# Patient Record
Sex: MALE | Race: OTHER
[De-identification: names, ages, dates, MRNs, and addresses within clinical notes are randomized per-mention and may not be internally consistent; named-entity substitution may affect disease eponyms.]

---

## 2020-02-11 ENCOUNTER — HOSPITAL ENCOUNTER (EMERGENCY)
Dept: HOSPITAL 72 - EMR | Age: 25
Discharge: HOME | End: 2020-02-11
Payer: COMMERCIAL

## 2020-02-11 VITALS — HEIGHT: 70 IN | WEIGHT: 161 LBS | BODY MASS INDEX: 23.05 KG/M2

## 2020-02-11 VITALS — DIASTOLIC BLOOD PRESSURE: 78 MMHG | SYSTOLIC BLOOD PRESSURE: 144 MMHG

## 2020-02-11 VITALS — SYSTOLIC BLOOD PRESSURE: 129 MMHG | DIASTOLIC BLOOD PRESSURE: 74 MMHG

## 2020-02-11 DIAGNOSIS — Z91.013: ICD-10-CM

## 2020-02-11 DIAGNOSIS — J45.901: Primary | ICD-10-CM

## 2020-02-11 DIAGNOSIS — R00.0: ICD-10-CM

## 2020-02-11 LAB
ADD MANUAL DIFF: NO
ALBUMIN SERPL-MCNC: 4.6 G/DL (ref 3.4–5)
ALBUMIN/GLOB SERPL: 1 {RATIO} (ref 1–2.7)
ALP SERPL-CCNC: 125 U/L (ref 46–116)
ALT SERPL-CCNC: 26 U/L (ref 12–78)
ANION GAP SERPL CALC-SCNC: 15 MMOL/L (ref 5–15)
APPEARANCE UR: CLEAR
APTT PPP: YELLOW S
AST SERPL-CCNC: 10 U/L (ref 15–37)
BASOPHILS NFR BLD AUTO: 0.6 % (ref 0–2)
BILIRUB SERPL-MCNC: 0.4 MG/DL (ref 0.2–1)
BUN SERPL-MCNC: 13 MG/DL (ref 7–18)
CALCIUM SERPL-MCNC: 10 MG/DL (ref 8.5–10.1)
CHLORIDE SERPL-SCNC: 106 MMOL/L (ref 98–107)
CO2 SERPL-SCNC: 23 MMOL/L (ref 21–32)
CREAT SERPL-MCNC: 1.2 MG/DL (ref 0.55–1.3)
EOSINOPHIL NFR BLD AUTO: 0 % (ref 0–3)
ERYTHROCYTE [DISTWIDTH] IN BLOOD BY AUTOMATED COUNT: 12.2 % (ref 11.6–14.8)
GLOBULIN SER-MCNC: 4.5 G/DL
GLUCOSE UR STRIP-MCNC: (no result) MG/DL
HCT VFR BLD CALC: 51 % (ref 42–52)
HGB BLD-MCNC: 16.6 G/DL (ref 14.2–18)
KETONES UR QL STRIP: (no result)
LEUKOCYTE ESTERASE UR QL STRIP: NEGATIVE
LYMPHOCYTES NFR BLD AUTO: 10.4 % (ref 20–45)
MCV RBC AUTO: 89 FL (ref 80–99)
MONOCYTES NFR BLD AUTO: 4.2 % (ref 1–10)
NEUTROPHILS NFR BLD AUTO: 84.8 % (ref 45–75)
NITRITE UR QL STRIP: NEGATIVE
PH UR STRIP: 6 [PH] (ref 4.5–8)
PLATELET # BLD: 186 K/UL (ref 150–450)
POTASSIUM SERPL-SCNC: 3.9 MMOL/L (ref 3.5–5.1)
PROT UR QL STRIP: (no result)
RBC # BLD AUTO: 5.76 M/UL (ref 4.7–6.1)
SODIUM SERPL-SCNC: 144 MMOL/L (ref 136–145)
SP GR UR STRIP: 1.02 (ref 1–1.03)
UROBILINOGEN UR-MCNC: 1 MG/DL (ref 0–1)
WBC # BLD AUTO: 5.9 K/UL (ref 4.8–10.8)

## 2020-02-11 PROCEDURE — 84484 ASSAY OF TROPONIN QUANT: CPT

## 2020-02-11 PROCEDURE — 93005 ELECTROCARDIOGRAM TRACING: CPT

## 2020-02-11 PROCEDURE — 71045 X-RAY EXAM CHEST 1 VIEW: CPT

## 2020-02-11 PROCEDURE — 36415 COLL VENOUS BLD VENIPUNCTURE: CPT

## 2020-02-11 PROCEDURE — 96361 HYDRATE IV INFUSION ADD-ON: CPT

## 2020-02-11 PROCEDURE — 81003 URINALYSIS AUTO W/O SCOPE: CPT

## 2020-02-11 PROCEDURE — 96374 THER/PROPH/DIAG INJ IV PUSH: CPT

## 2020-02-11 PROCEDURE — 80053 COMPREHEN METABOLIC PANEL: CPT

## 2020-02-11 PROCEDURE — 80307 DRUG TEST PRSMV CHEM ANLYZR: CPT

## 2020-02-11 PROCEDURE — 85025 COMPLETE CBC W/AUTO DIFF WBC: CPT

## 2020-02-11 PROCEDURE — 99284 EMERGENCY DEPT VISIT MOD MDM: CPT

## 2020-02-11 RX ADMIN — IPRATROPIUM BROMIDE AND ALBUTEROL SULFATE SCH ML: .5; 3 SOLUTION RESPIRATORY (INHALATION) at 19:00

## 2020-02-11 RX ADMIN — IPRATROPIUM BROMIDE AND ALBUTEROL SULFATE SCH ML: .5; 3 SOLUTION RESPIRATORY (INHALATION) at 18:45

## 2020-02-11 RX ADMIN — IPRATROPIUM BROMIDE AND ALBUTEROL SULFATE SCH ML: .5; 3 SOLUTION RESPIRATORY (INHALATION) at 18:48

## 2020-02-11 NOTE — NUR
ED Nurse Note:.



Pt ambulated to ed with mother c/o difficulty breathing x 15 min ago. pt has hx 
of asthma. states inhaler is not working

## 2020-02-11 NOTE — EMERGENCY ROOM REPORT
History of Present Illness


General


Chief Complaint:  Asthma


Source:  Patient





Present Illness


HPI


24-year-old male with history of asthma here complaining of asthma 

exacerbation.  Patient reports that for the past 2 days he has been wheezing 

excessively, cough, denies congestion fever and chills.  Denies recent travel.  

Denies abdominal pain, nausea vomiting.  Diffuse wheezing noted.  Patient 

appears to be tachycardic upon arrival.  Denies any drug use, tobacco smoke.  

Denies chest pain, chest pain radiation, weakness.  Has not taken medication 

for symptom relief other than his albuterol inhaler.


Allergies:  


Coded Allergies:  


     Shrimp (Verified  Allergy, Intermediate, swollen lips, itch, 3/2/14)





Patient History


Past Medical History:  see triage record


Past Surgical History:  none


Pertinent Family History:  none


Immunizations:  UTD


Reviewed Nursing Documentation:  PMH: Agreed; PSxH: Agreed





Nursing Documentation-PMH


Past Medical History:  No History, Except For


Hx Asthma:  Yes





Review of Systems


All Other Systems:  negative except mentioned in HPI





Physical Exam





Vital Signs








  Date Time  Temp Pulse Resp B/P (MAP) Pulse Ox O2 Delivery O2 Flow Rate FiO2


 


2/11/20 18:29  88 24     


 


2/11/20 18:29 98.1   129/74 92 Room Air  


 


2/11/20 18:48        21








Sp02 EP Interpretation:  reviewed, normal


General Appearance:  no apparent distress, alert, GCS 15, non-toxic


Head:  normocephalic, atraumatic


Eyes:  bilateral eye normal inspection, bilateral eye PERRL


ENT:  hearing grossly normal, normal pharynx, no angioedema, normal voice


Neck:  full range of motion, supple, supple/symm/no masses


Respiratory:  no rhonchi, no respiratory distress, no retraction, no accessory 

muscle use, wheezing - Diffuse wheezing


Cardiovascular #1:  regular rate, rhythm, no edema, no gallop, no murmur


Gastrointestinal:  normal bowel sounds, non tender, soft, non-distended, no 

guarding, no rebound


Rectal:  deferred


Genitourinary:  no CVA tenderness


Musculoskeletal:  back normal, no calf tenderness


Neurologic:  alert, motor strength/tone normal, oriented x3, sensory intact, 

responsive, speech normal


Psychiatric:  judgement/insight normal, memory normal, mood/affect normal, no 

suicidal/homicidal ideation


Skin:  no rash


Lymphatic:  no adenopathy





Medical Decision Making


PA Attestation


All my diagnosis and treatment plans were reviewed ad discussed with my 

supervising physician Dr. Stephen


Diagnostic Impression:  


 Primary Impression:  


 Asthma exacerbation


ER Course


24-year-old male with history of asthma here complaining of asthma 

exacerbation.  Patient reports that for the past 2 days he has been wheezing 

excessively, cough, denies congestion fever and chills.  Denies recent travel.  

Denies abdominal pain, nausea vomiting.  Diffuse wheezing noted.  Patient 

appears to be tachycardic upon arrival.  Denies any drug use, tobacco smoke.  

Denies chest pain, chest pain radiation, weakness.  Has not taken medication 

for symptom relief other than his albuterol inhaler.





Ddx considered but are not limited to: bronchitis, PNA, URI viral, bacterial 

bronchitis, asthma exacerbation





Vital signs: are WNL, pt. is afebrile





H&PE are most consistent with: Asthma exacerbation





ORDERS: Chest x-ray, CBC, CMP, UA, tox screen, albuterol, prednisone, 

guaifenesin





ED INTERVENTIONS: Dexamethasone, 3 treatments of albuterol ipratropium 

nebulizer treatment








DISCHARGE: At this time pt. is stable for d/c to home. Will provide printed 

patient care instructions, and any necessary prescriptions. Care plan and 

follow up instructions have been discussed with the patient prior to discharge.

  Take medication as directed, follow-up with primary care provider for 

reassessment of asthma, if worsening symptoms return emergency room, avoid 

using marijuana.  Patient tachycardia resolved after giving fluids as well as 

breathing treatment however advised to follow-up with cardiologist return to 

emergency room if increased heart rate.


EKG Diagnostic Results


Rate:  tachycardiac


Rhythm:  other - sinus tachy


ST Segments:  no acute changes


Other Impression


No acute ST changes





Chest X-Ray Diagnostic Results


Chest X-Ray Diagnostic Results :  


   Chest X-Ray Ordered:  Yes


   # of Views/Limited/Complete:  1 View


   Indication:  Shortness of Breath


   EP Interpretation:  Yes


   PA Xray:  Interpretation reviewed, by supervising MD, and agrees with 

findings.


   Interpretation:  no consolidation, no effusion, no pneumothorax


   Impression:  No acute disease


   Electronically Signed by:  Ignacio Mckeon PA-C





Last Vital Signs








  Date Time  Temp Pulse Resp B/P (MAP) Pulse Ox O2 Delivery O2 Flow Rate FiO2


 


2/11/20 19:31  117 22  98 Room Air  21





  113 20  96   


 


2/11/20 18:31 97.9   153/76 (101)    








Status:  improved


Disposition:  HOME, SELF-CARE


Condition:  Stable


Scripts


Guaifenesin* (GUAIFENESIN*) 100 Mg/5 Ml Liquid


5 ML ORAL Q6H, #120 ML 0 Refills


   Prov: Ignacio Young         2/11/20 


Prednisone* (PREDNISONE*) 20 Mg Tablet


40 MG ORAL DAILY for 5 Days, #10 TAB


   Prov: Ignacio Young         2/11/20 


Albuterol Sulfate (VENTOLIN HFA) 18 Gm Hfa.[email or web address]


2 PUFFS INH EVERY 6 HOURS, #18 GM 0 Refills


   Prov: Ignacio Young         2/11/20


Referrals:  


[facility],REFERRING (PCP)


Patient Instructions:  Asthma, Adult





Additional Instructions:  


Take medication as directed, follow-up with your primary care provider, avoid 

using marijuana, follow-up with your primary care provider, if worsening 

symptoms return to the emergency room











Ignacio Young Feb 11, 2020 20:02

## 2020-02-11 NOTE — NUR
ED Nurse Note:



All orders completed per ERMD orders. Pt cleared by health care Provider for 
discharge.  DC instructions/prescription was given and explained to pt and 
verbalized understanding of teachings. Instructed pt to follow up with primary 
care physican within one week for further care. All medical devices such as ID 
band and IV removed. Pt is AAO x4, VSS, RA, no signs of resp distress. Pt 
ambulatory and left with all personal belongings.

## 2020-02-12 NOTE — DIAGNOSTIC IMAGING REPORT
Indication: Chest pain

 

Technique: One view of the chest

 

Comparison: none

 

Findings: Lungs and pleural spaces are clear. Heart size is normal.

 

Impression: No acute process